# Patient Record
(demographics unavailable — no encounter records)

---

## 2025-04-18 NOTE — HISTORY OF PRESENT ILLNESS
[FreeTextEntry1] : Patient is a male presenting for initial neurological evaluation after transfer of care from Dr. Benites (retired epileptologist). Patient has a history of focal seizures, previously well-controlled on Dilantin and Keppra. Last reported seizure activity was in 2024, described as a 'blackout' episode lasting several hours following an argument with his brother. Typical seizure manifestation involves absence-like episodes with staring spells, no convulsions or focal motor symptoms. Patient denies any current seizure activity. Current medications include Dilantin (brand name) twice daily and Keppra 500mg twice daily. Keppra was added approximately [id number] due to low Dilantin levels. Patient denies any side effects from medications, specifically no gum problems or unsteady gait. Last Dilantin level check date unknown. Patient does not drive. Initial seizure diagnosis was made in hospital ([facility]), after which he was referred to Dr. Benites for specialized epilepsy care.

## 2025-04-18 NOTE — ASSESSMENT
[FreeTextEntry1] : 1. Focal Epilepsy - Currently stable: - Continue current antiepileptic regimen: Dilantin (brand name) BID and Keppra 500mg BID - Obtain trough Dilantin level before morning dose - Maintaining current regimen given historical success and stability  2. Follow-up Plan: - Return visit in 6 months - If stable at 6-month follow-up, may transition to annual visits - Earlier follow-up if breakthrough seizures occur - Medications prescribed for 1-year supply  3. Precautions discussed: - Patient aware of driving restrictions - Continue seizure precautions - Return sooner if new symptoms develop